# Patient Record
Sex: MALE | Race: WHITE | URBAN - NONMETROPOLITAN AREA
[De-identification: names, ages, dates, MRNs, and addresses within clinical notes are randomized per-mention and may not be internally consistent; named-entity substitution may affect disease eponyms.]

---

## 2021-04-07 ENCOUNTER — OFFICE VISIT (OUTPATIENT)
Dept: FAMILY MEDICINE CLINIC | Age: 51
End: 2021-04-07

## 2021-04-07 VITALS
WEIGHT: 148 LBS | HEART RATE: 88 BPM | OXYGEN SATURATION: 95 % | DIASTOLIC BLOOD PRESSURE: 120 MMHG | TEMPERATURE: 97.5 F | SYSTOLIC BLOOD PRESSURE: 205 MMHG

## 2021-04-07 DIAGNOSIS — Z53.21 PATIENT LEFT WITHOUT BEING SEEN: Primary | ICD-10-CM

## 2021-04-07 NOTE — PROGRESS NOTES
Patient signed in to be seen on express care. Nelson Mendoza did take patient's vital signs and chief complaint. His blood pressure was found to be excessively high at 205/120. Patient has not been to a medical facility in years. She discussed with him that this was extremely high and told him that if it remained grossly elevated he most likely would be sent to the emergency department. Patient told Nelson Mendoza that he would not go to the ED and she explained that I would recheck his blood pressure once I was in the room. I did wait approximately 5 minutes as he was filling out his new patient papers and then knocked on the room door. Patient was not in the room and no one had seen him leave the office nor was he in the restroom. The window in the room had been opened and there is no screen in place. We do believe that he exited the building by way of that window without being seen.